# Patient Record
Sex: MALE | Race: BLACK OR AFRICAN AMERICAN | NOT HISPANIC OR LATINO | Employment: FULL TIME | URBAN - METROPOLITAN AREA
[De-identification: names, ages, dates, MRNs, and addresses within clinical notes are randomized per-mention and may not be internally consistent; named-entity substitution may affect disease eponyms.]

---

## 2024-10-24 ENCOUNTER — OFFICE VISIT (OUTPATIENT)
Dept: PRIMARY CARE CLINIC | Facility: CLINIC | Age: 49
End: 2024-10-24
Payer: COMMERCIAL

## 2024-10-24 VITALS
RESPIRATION RATE: 18 BRPM | WEIGHT: 173.63 LBS | DIASTOLIC BLOOD PRESSURE: 85 MMHG | OXYGEN SATURATION: 99 % | SYSTOLIC BLOOD PRESSURE: 125 MMHG | HEART RATE: 60 BPM | BODY MASS INDEX: 24.86 KG/M2 | HEIGHT: 70 IN

## 2024-10-24 DIAGNOSIS — Z12.5 SCREENING FOR PROSTATE CANCER: ICD-10-CM

## 2024-10-24 DIAGNOSIS — I10 PRIMARY HYPERTENSION: ICD-10-CM

## 2024-10-24 DIAGNOSIS — R22.1 NECK MASS: ICD-10-CM

## 2024-10-24 DIAGNOSIS — Z12.12 ENCOUNTER FOR COLORECTAL CANCER SCREENING: ICD-10-CM

## 2024-10-24 DIAGNOSIS — Z00.00 ENCOUNTER FOR WELLNESS EXAMINATION IN ADULT: Primary | ICD-10-CM

## 2024-10-24 DIAGNOSIS — Z12.11 ENCOUNTER FOR COLORECTAL CANCER SCREENING: ICD-10-CM

## 2024-10-24 LAB
ALBUMIN SERPL-MCNC: 3.9 G/DL (ref 3.5–5)
ALBUMIN/GLOB SERPL: 1.3 RATIO (ref 1.1–2)
ALP SERPL-CCNC: 68 UNIT/L (ref 40–150)
ALT SERPL-CCNC: 23 UNIT/L (ref 0–55)
ANION GAP SERPL CALC-SCNC: 8 MEQ/L
AST SERPL-CCNC: 18 UNIT/L (ref 5–34)
BASOPHILS # BLD AUTO: 0.03 X10(3)/MCL
BASOPHILS NFR BLD AUTO: 0.4 %
BILIRUB SERPL-MCNC: 0.7 MG/DL
BUN SERPL-MCNC: 16.6 MG/DL (ref 8.9–20.6)
CALCIUM SERPL-MCNC: 9 MG/DL (ref 8.4–10.2)
CHLORIDE SERPL-SCNC: 109 MMOL/L (ref 98–107)
CHOLEST SERPL-MCNC: 171 MG/DL
CHOLEST/HDLC SERPL: 3 {RATIO} (ref 0–5)
CO2 SERPL-SCNC: 24 MMOL/L (ref 22–29)
CREAT SERPL-MCNC: 1.2 MG/DL (ref 0.72–1.25)
CREAT/UREA NIT SERPL: 14
EOSINOPHIL # BLD AUTO: 0.13 X10(3)/MCL (ref 0–0.9)
EOSINOPHIL NFR BLD AUTO: 1.7 %
ERYTHROCYTE [DISTWIDTH] IN BLOOD BY AUTOMATED COUNT: 13 % (ref 11.5–17)
EST. AVERAGE GLUCOSE BLD GHB EST-MCNC: 99.7 MG/DL
GFR SERPLBLD CREATININE-BSD FMLA CKD-EPI: >60 ML/MIN/1.73/M2
GLOBULIN SER-MCNC: 3.1 GM/DL (ref 2.4–3.5)
GLUCOSE SERPL-MCNC: 90 MG/DL (ref 74–100)
HBA1C MFR BLD: 5.1 %
HCT VFR BLD AUTO: 44.2 % (ref 42–52)
HDLC SERPL-MCNC: 58 MG/DL (ref 35–60)
HGB BLD-MCNC: 15.3 G/DL (ref 14–18)
IMM GRANULOCYTES # BLD AUTO: 0.02 X10(3)/MCL (ref 0–0.04)
IMM GRANULOCYTES NFR BLD AUTO: 0.3 %
LDLC SERPL CALC-MCNC: 100 MG/DL (ref 50–140)
LYMPHOCYTES # BLD AUTO: 1.96 X10(3)/MCL (ref 0.6–4.6)
LYMPHOCYTES NFR BLD AUTO: 26.1 %
MCH RBC QN AUTO: 29.3 PG (ref 27–31)
MCHC RBC AUTO-ENTMCNC: 34.6 G/DL (ref 33–36)
MCV RBC AUTO: 84.5 FL (ref 80–94)
MONOCYTES # BLD AUTO: 0.61 X10(3)/MCL (ref 0.1–1.3)
MONOCYTES NFR BLD AUTO: 8.1 %
NEUTROPHILS # BLD AUTO: 4.77 X10(3)/MCL (ref 2.1–9.2)
NEUTROPHILS NFR BLD AUTO: 63.4 %
NRBC BLD AUTO-RTO: 0 %
PLATELET # BLD AUTO: 225 X10(3)/MCL (ref 130–400)
PMV BLD AUTO: 11.5 FL (ref 7.4–10.4)
POTASSIUM SERPL-SCNC: 4.1 MMOL/L (ref 3.5–5.1)
PROT SERPL-MCNC: 7 GM/DL (ref 6.4–8.3)
PSA SERPL-MCNC: 0.5 NG/ML
RBC # BLD AUTO: 5.23 X10(6)/MCL (ref 4.7–6.1)
SODIUM SERPL-SCNC: 141 MMOL/L (ref 136–145)
TRIGL SERPL-MCNC: 65 MG/DL (ref 34–140)
TSH SERPL-ACNC: 1.26 UIU/ML (ref 0.35–4.94)
VLDLC SERPL CALC-MCNC: 13 MG/DL
WBC # BLD AUTO: 7.52 X10(3)/MCL (ref 4.5–11.5)

## 2024-10-24 PROCEDURE — 80061 LIPID PANEL: CPT | Performed by: STUDENT IN AN ORGANIZED HEALTH CARE EDUCATION/TRAINING PROGRAM

## 2024-10-24 PROCEDURE — 84153 ASSAY OF PSA TOTAL: CPT | Performed by: STUDENT IN AN ORGANIZED HEALTH CARE EDUCATION/TRAINING PROGRAM

## 2024-10-24 PROCEDURE — 80053 COMPREHEN METABOLIC PANEL: CPT | Performed by: STUDENT IN AN ORGANIZED HEALTH CARE EDUCATION/TRAINING PROGRAM

## 2024-10-24 PROCEDURE — 84443 ASSAY THYROID STIM HORMONE: CPT | Performed by: STUDENT IN AN ORGANIZED HEALTH CARE EDUCATION/TRAINING PROGRAM

## 2024-10-24 PROCEDURE — 36415 COLL VENOUS BLD VENIPUNCTURE: CPT | Performed by: STUDENT IN AN ORGANIZED HEALTH CARE EDUCATION/TRAINING PROGRAM

## 2024-10-24 PROCEDURE — 85025 COMPLETE CBC W/AUTO DIFF WBC: CPT | Performed by: STUDENT IN AN ORGANIZED HEALTH CARE EDUCATION/TRAINING PROGRAM

## 2024-10-24 PROCEDURE — 83036 HEMOGLOBIN GLYCOSYLATED A1C: CPT | Performed by: STUDENT IN AN ORGANIZED HEALTH CARE EDUCATION/TRAINING PROGRAM

## 2024-10-24 RX ORDER — HYDROCHLOROTHIAZIDE 25 MG/1
25 TABLET ORAL DAILY
COMMUNITY
Start: 2024-08-21 | End: 2024-10-24 | Stop reason: SDUPTHER

## 2024-10-24 RX ORDER — AMLODIPINE BESYLATE 10 MG/1
10 TABLET ORAL DAILY
COMMUNITY
Start: 2024-08-21 | End: 2024-10-24 | Stop reason: SDUPTHER

## 2024-10-24 RX ORDER — AMLODIPINE BESYLATE 10 MG/1
10 TABLET ORAL DAILY
Qty: 90 TABLET | Refills: 1 | Status: SHIPPED | OUTPATIENT
Start: 2024-10-24 | End: 2025-04-22

## 2024-10-24 RX ORDER — HYDROCHLOROTHIAZIDE 25 MG/1
25 TABLET ORAL DAILY
Qty: 90 TABLET | Refills: 1 | Status: SHIPPED | OUTPATIENT
Start: 2024-10-24 | End: 2025-04-22

## 2024-10-24 NOTE — PROGRESS NOTES
Chief Complaint  Chief Complaint   Patient presents with    [facility]       HPI  Aidan Aceves is a 49 y.o. male with medical diagnoses as listed in the medical history and problem list that presents for new patient wellness examination.     Current and past medical history reviewed.  Pertinent family and social history reviewed.  - Just moved from [place] for work   -    Colorectal cancer screening:  Cologuard pending  Prostate CA screening:  Prostate CA screening pending   Vaccinations due:  All vaccinations due and/or desired have been addressed and given    Patient also complains of a small bump on the back of his neck that has been there for years but recently got bigger. No pain or drainage from it. Patient had an ultrasound 2021 suggesting biopsy but he did not follow through with it.       Health Maintenance         Date Due Completion Date    Hepatitis C Screening Never done ---    HIV Screening Never done ---    TETANUS VACCINE Never done ---    Colorectal Cancer Screening Never done ---    Influenza Vaccine (1) Never done ---    COVID-19 Vaccine (1 - 2024-25 season) Never done ---    Lipid Panel 09/28/2028 9/28/2023    RSV Vaccine (Age 60+ and Pregnant patients) (1 - 1-dose 75+ series) 04/03/2051 ---            ALLERGIES AND MEDICATIONS: updated and reviewed.  Review of patient's allergies indicates:  No Known Allergies  Current Outpatient Medications   Medication Sig Dispense Refill    amLODIPine (NORVASC) 10 MG tablet Take 1 tablet (10 mg total) by mouth once daily. 90 tablet 1    hydroCHLOROthiazide (HYDRODIURIL) 25 MG tablet Take 1 tablet (25 mg total) by mouth once daily. 90 tablet 1     No current facility-administered medications for this visit.       Histories are reviewed and updated as appropriate     Review of Systems  Comprehensive review of system performed- negative except noted in HPI       Objective:   Vitals:    10/24/24 1309   BP: 125/85   BP Location: Left arm   Patient  "Position: Sitting   Pulse: 60   Resp: 18   SpO2: 99%   Weight: 78.7 kg (173 lb 9.6 oz)   Height: 5' 10" (1.778 m)    Body mass index is 24.91 kg/m².  Physical Exam  Vitals and nursing note reviewed.   Constitutional:       General: He is not in acute distress.     Appearance: Normal appearance.   HENT:      Head: Normocephalic and atraumatic.      Mouth/Throat:      Mouth: Mucous membranes are moist.   Eyes:      Extraocular Movements: Extraocular movements intact.      Conjunctiva/sclera: Conjunctivae normal.   Neck:      Comments: Raised lesion posterior neck measured 8dqm2fld~1.3cm- firm, mobile and non-temder  Cardiovascular:      Rate and Rhythm: Normal rate.   Pulmonary:      Effort: Pulmonary effort is normal.      Breath sounds: Normal breath sounds.   Abdominal:      General: Abdomen is flat. Bowel sounds are normal.      Palpations: Abdomen is soft.   Musculoskeletal:         General: Normal range of motion.      Cervical back: Normal range of motion.   Skin:     General: Skin is warm and dry.   Neurological:      General: No focal deficit present.      Mental Status: He is alert and oriented to person, place, and time. Mental status is at baseline.   Psychiatric:         Mood and Affect: Mood normal.         Behavior: Behavior normal.           Assessment & Plan  1. Encounter for wellness examination in adult  Overall health status was reviewed   Good health habits reinforced   Labs pending Appropriate recommendations and preventative care medical information provided, with annual wellness exam encouraged.     2. Primary hypertension  -    Stable on current regimen    -    hydroCHLOROthiazide (HYDRODIURIL) 25 MG tablet; Take 1 tablet (25 mg total) by mouth once daily.  Dispense: 90 tablet; Refill: 1  -     amLODIPine (NORVASC) 10 MG tablet; Take 1 tablet (10 mg total) by mouth once daily.  Dispense: 90 tablet; Refill: 1    3. Neck mass  -     US Soft Tissue Head Neck; Future; Expected date: 10/24/2024  -  "    Ambulatory referral/consult to General Surgery; Future; Expected date: 10/31/2024    4. Encounter for colorectal cancer screening  -     Cologuard Screening (Multitarget Stool DNA); Future; Expected date: 10/24/2024       RTC in 6 months for chronic conditions follow up

## 2024-10-25 ENCOUNTER — TELEPHONE (OUTPATIENT)
Dept: SURGERY | Facility: CLINIC | Age: 49
End: 2024-10-25
Payer: COMMERCIAL

## 2024-10-30 ENCOUNTER — PATIENT MESSAGE (OUTPATIENT)
Dept: PRIMARY CARE CLINIC | Facility: CLINIC | Age: 49
End: 2024-10-30
Payer: COMMERCIAL

## 2024-11-04 ENCOUNTER — TELEPHONE (OUTPATIENT)
Dept: PRIMARY CARE CLINIC | Facility: CLINIC | Age: 49
End: 2024-11-04
Payer: COMMERCIAL

## 2024-11-04 NOTE — TELEPHONE ENCOUNTER
----- Message from Cassius Lambert MD sent at 10/30/2024  4:12 PM CDT -----  All the bloodwork are within normal limit. Including blood count, electrolyte, thyroid, diabetes, cholesterol and prostate,

## 2024-11-12 ENCOUNTER — TELEPHONE (OUTPATIENT)
Dept: PRIMARY CARE CLINIC | Facility: CLINIC | Age: 49
End: 2024-11-12
Payer: COMMERCIAL

## 2024-11-13 NOTE — PROGRESS NOTES
[facility] - General Surgery Services  [address] [place] [id number]  Phone: [phone]  Fax: [phone]    History and Physical   General Surgery  Dr. Jorge Horn   Patient Name: Aidan Aceves  YOB: 1975    Date: 11/14/2024                     SUBJECTIVE:     Chief Complaint/Reason for Admission:   Chief Complaint   Patient presents with    Consult     Neck mass         History of Present Illness:  Mr. Aidan Aceves is a 49 y.o. male referred for surgical evaluation of mass. He reports a mass on posterior neck, present for several years, it has continued to increase in size and is now causing some discomfort. Denies trauma. Denies CP / SOB. Denies fever / chills.   No imaging.    Referring provider: Cassius Lambert MD     Patient Care Team:  Cassius Lambert MD as PCP - General (Family Medicine)     Review of Systems:  12 point ROS negative except as stated in HPI    PAST HISTORY:     Past Medical History:   Diagnosis Date    Hypertension      Past Surgical History:   Procedure Laterality Date    BACK SURGERY      microdesectomy     Family History   Problem Relation Name Age of Onset    Hypertension Mother      No Known Problems Father       Social History     Socioeconomic History    Marital status:    Tobacco Use    Smoking status: Some Days     Types: Cigarettes    Smokeless tobacco: Never   Substance and Sexual Activity    Alcohol use: Yes     Alcohol/week: 4.0 standard drinks of alcohol     Types: 4 Drinks containing 0.5 oz of alcohol per week    Drug use: Never       MEDICATIONS & ALLERGIES:     Current Outpatient Medications on File Prior to Visit   Medication Sig    amLODIPine (NORVASC) 10 MG tablet Take 1 tablet (10 mg total) by mouth once daily.    hydroCHLOROthiazide (HYDRODIURIL) 25 MG tablet Take 1 tablet (25 mg total) by mouth once daily.     No current facility-administered medications on file prior to visit.     Review of  "patient's allergies indicates:  No Known Allergies    OBJECTIVE:     Vitals:    11/14/24 0915   BP: 125/85   Pulse: 76   Weight: 79.4 kg (175 lb)   Height: 5' 10" (1.778 m)     Body mass index is 25.11 kg/m².    Physical Exam:  General:  Well developed, well nourished, no acute distress  HEENT:  Normocephalic, atraumatic, PERRL, EOMI, clear sclera, ears normal, neck supple, throat clear without erythema or exudates  CVS:  RRR, S1 and S2 normal, no murmurs, rubs, gallops  Resp:  Lungs clear to auscultation, no wheezes, rales, rhonchi, cough  GI:  Abdomen soft, non-tender, non-distended, normoactive bowel sounds, no masses  :  Deferred  MSK:  No muscle atrophy, cyanosis, peripheral edema, full range of motion  Skin:   midline posterior neck  Size: 3 CM  Neuro:  CNII-XII grossly intact  Psych:  Alert and oriented to person, place, and time    Results:  I have independently reviewed all pertinent lab and radiologic studies relevant to general surgery.    VISIT DIAGNOSES:       ICD-10-CM ICD-9-CM   1. Neck mass  R22.1 784.2       ASSESSMENT/PLAN:   Mr. Aidan Aceves is a 49 y.o. male with soft tissue mass.     Plan:  - Excision of soft tissue mass   posterior neck  Size: 3 CM  - MAC anesthesia  - Dr. Horn examined patient, discussed recommendations, discussed risks/benefits of procedure, obtained informed consent, and answered all questions.   - Counseling included differential diagnoses, treatment options, risks and benefits, lifestyle changes, prognosis, and medications.    The patient was interactive, and verbalized understanding.    JORJE Schwartz    I, JORJE Snider, am scribing for, and in the presence of, Jorge Horn MD, performed the above scribed service and the documentation accurately describes the services I performed. I attest to the accuracy of the note.            "

## 2024-11-14 ENCOUNTER — OFFICE VISIT (OUTPATIENT)
Dept: SURGERY | Facility: CLINIC | Age: 49
End: 2024-11-14
Payer: COMMERCIAL

## 2024-11-14 VITALS
HEART RATE: 76 BPM | DIASTOLIC BLOOD PRESSURE: 85 MMHG | WEIGHT: 175 LBS | HEIGHT: 70 IN | SYSTOLIC BLOOD PRESSURE: 125 MMHG | BODY MASS INDEX: 25.05 KG/M2

## 2024-11-14 DIAGNOSIS — R22.1 NECK MASS: ICD-10-CM

## 2024-11-14 PROCEDURE — 99204 OFFICE O/P NEW MOD 45 MIN: CPT | Mod: ,,, | Performed by: SURGERY

## 2024-11-14 PROCEDURE — 3079F DIAST BP 80-89 MM HG: CPT | Mod: CPTII,,, | Performed by: SURGERY

## 2024-11-14 PROCEDURE — 3074F SYST BP LT 130 MM HG: CPT | Mod: CPTII,,, | Performed by: SURGERY

## 2024-11-14 PROCEDURE — 3008F BODY MASS INDEX DOCD: CPT | Mod: CPTII,,, | Performed by: SURGERY

## 2024-11-14 PROCEDURE — 3044F HG A1C LEVEL LT 7.0%: CPT | Mod: CPTII,,, | Performed by: SURGERY

## 2024-11-14 PROCEDURE — 1159F MED LIST DOCD IN RCRD: CPT | Mod: CPTII,,, | Performed by: SURGERY

## 2024-11-14 NOTE — TELEPHONE ENCOUNTER
Called pt to offer appt. Next appt not until 2-3 weeks out. Pt stated he will try to go [place] [facility] [place] to [place]

## 2024-11-17 ENCOUNTER — OFFICE VISIT (OUTPATIENT)
Dept: URGENT CARE | Facility: CLINIC | Age: 49
End: 2024-11-17
Payer: COMMERCIAL

## 2024-11-17 VITALS
BODY MASS INDEX: 25.05 KG/M2 | HEART RATE: 63 BPM | WEIGHT: 175 LBS | DIASTOLIC BLOOD PRESSURE: 86 MMHG | OXYGEN SATURATION: 98 % | TEMPERATURE: 99 F | HEIGHT: 70 IN | SYSTOLIC BLOOD PRESSURE: 129 MMHG | RESPIRATION RATE: 17 BRPM

## 2024-11-17 DIAGNOSIS — R21 RASH AND NONSPECIFIC SKIN ERUPTION: Primary | ICD-10-CM

## 2024-11-17 PROCEDURE — 96372 THER/PROPH/DIAG INJ SC/IM: CPT | Mod: ,,, | Performed by: FAMILY MEDICINE

## 2024-11-17 PROCEDURE — 99213 OFFICE O/P EST LOW 20 MIN: CPT | Mod: 25,,, | Performed by: FAMILY MEDICINE

## 2024-11-17 RX ORDER — PREDNISONE 20 MG/1
40 TABLET ORAL DAILY
Qty: 10 TABLET | Refills: 0 | Status: SHIPPED | OUTPATIENT
Start: 2024-11-17 | End: 2024-11-22

## 2024-11-17 RX ORDER — DEXAMETHASONE SODIUM PHOSPHATE 10 MG/ML
10 INJECTION INTRAMUSCULAR; INTRAVENOUS
Status: COMPLETED | OUTPATIENT
Start: 2024-11-17 | End: 2024-11-17

## 2024-11-17 RX ADMIN — DEXAMETHASONE SODIUM PHOSPHATE 10 MG: 10 INJECTION INTRAMUSCULAR; INTRAVENOUS at 11:11

## 2024-11-17 NOTE — PATIENT INSTRUCTIONS
Plan:   Medications sent to pharmacy  Start oral steroids tomorrow  Claritin or Zyrtec in the morning and Benadryl at bedtime to help with itch  Keep a diary of what your coming into contact with, what you are putting on the body in the body to see identify a trigger.  As discussed if there is no improvement in a week we will refer you to Dermatology.  Call us for referral

## 2024-11-17 NOTE — PROGRESS NOTES
"Subjective:      Patient ID: Aidan Aceves is a 49 y.o. male.    Vitals:  height is 5' 10" (1.778 m) and weight is 79.4 kg (175 lb). His temperature is 98.5 °F (36.9 °C). His blood pressure is 129/86 and his pulse is 63. His respiration is 17 and oxygen saturation is 98%.     Chief Complaint: Rash     Patient is a 49 y.o. male who presents to urgent care with complaints of  itching and burning all over body for the last 2 weeks and is spreading.  Patient denies fever.  Patient states it began with a large oval patch of rash on the right upper thigh area then it spread to the arms and trunk.  Denies any changes in foods soaps detergents medications.  Denies any recent travel.  Denies any new pets in the home.  States nothing has changed.  He has been using shaped butter but has use that in the past without issues.  Denies any shortness a breath wheezing lip swelling or tongue swelling    Rash      Constitution: Negative.   HENT: Negative.     Neck: neck negative.   Cardiovascular: Negative.    Eyes: Negative.    Respiratory: Negative.     Gastrointestinal: Negative.    Genitourinary: Negative.    Musculoskeletal: Negative.    Skin:  Positive for rash.   Allergic/Immunologic: Negative.    Neurological: Negative.    Hematologic/Lymphatic: Negative.       Objective:     Physical Exam   Constitutional: He is oriented to person, place, and time.  Non-toxic appearance. He does not appear ill. No distress.   HENT:   Head: Normocephalic and atraumatic.   Mouth/Throat: No oropharyngeal exudate or posterior oropharyngeal erythema (no lip or tongue swelling).   Eyes: Conjunctivae are normal.   Pulmonary/Chest: Effort normal. No respiratory distress.   Abdominal: Normal appearance.   Neurological: He is oriented to person, place, and time.   Skin: Skin is not diaphoretic and rash (erythemic papular rash on the bilateral arms and trunk).   Psychiatric: His behavior is normal. Mood, judgment and thought content normal.   Vitals " "reviewed.         Previous History      Review of patient's allergies indicates:  No Known Allergies    Past Medical History:   Diagnosis Date    Hypertension      Current Outpatient Medications   Medication Instructions    amLODIPine (NORVASC) 10 mg, Oral, Daily    hydroCHLOROthiazide (HYDRODIURIL) 25 mg, Oral, Daily    predniSONE (DELTASONE) 40 mg, Oral, Daily     Past Surgical History:   Procedure Laterality Date    BACK SURGERY      microdesectomy    SPINE SURGERY       Family History   Problem Relation Name Age of Onset    Hypertension Mother Mela     Cancer Father Migel        Social History     Tobacco Use    Smoking status: Some Days     Types: Cigarettes, Cigars    Smokeless tobacco: Never   Substance Use Topics    Alcohol use: Yes     Alcohol/week: 4.0 standard drinks of alcohol     Types: 4 Drinks containing 0.5 oz of alcohol per week    Drug use: Never        Physical Exam      Vital Signs Reviewed   /86   Pulse 63   Temp 98.5 °F (36.9 °C)   Resp 17   Ht 5' 10" (1.778 m)   Wt 79.4 kg (175 lb)   SpO2 98%   BMI 25.11 kg/m²        Procedures    Procedures     Labs     Results for orders placed or performed in visit on 10/24/24   Comprehensive Metabolic Panel    Collection Time: 10/24/24  2:02 PM   Result Value Ref Range    Sodium 141 136 - 145 mmol/L    Potassium 4.1 3.5 - 5.1 mmol/L    Chloride 109 (H) 98 - 107 mmol/L    CO2 24 22 - 29 mmol/L    Glucose 90 74 - 100 mg/dL    Blood Urea Nitrogen 16.6 8.9 - 20.6 mg/dL    Creatinine 1.20 0.72 - 1.25 mg/dL    Calcium 9.0 8.4 - 10.2 mg/dL    Protein Total 7.0 6.4 - 8.3 gm/dL    Albumin 3.9 3.5 - 5.0 g/dL    Globulin 3.1 2.4 - 3.5 gm/dL    Albumin/Globulin Ratio 1.3 1.1 - 2.0 ratio    Bilirubin Total 0.7 <=1.5 mg/dL    ALP 68 40 - 150 unit/L    ALT 23 0 - 55 unit/L    AST 18 5 - 34 unit/L    eGFR >60 mL/min/1.73/m2    Anion Gap 8.0 mEq/L    BUN/Creatinine Ratio 14    Hemoglobin A1C    Collection Time: 10/24/24  2:02 PM   Result Value Ref Range    " Hemoglobin A1c 5.1 <=7.0 %    Estimated Average Glucose 99.7 mg/dL   Lipid Panel    Collection Time: 10/24/24  2:02 PM   Result Value Ref Range    Cholesterol Total 171 <=200 mg/dL    HDL Cholesterol 58 35 - 60 mg/dL    Triglyceride 65 34 - 140 mg/dL    Cholesterol/HDL Ratio 3 0 - 5    Very Low Density Lipoprotein 13     LDL Cholesterol 100.00 50.00 - 140.00 mg/dL   PSA, Screening    Collection Time: 10/24/24  2:02 PM   Result Value Ref Range    Prostate Specific Antigen 0.50 <=4.00 ng/mL   TSH    Collection Time: 10/24/24  2:02 PM   Result Value Ref Range    TSH 1.261 0.350 - 4.940 uIU/mL   CBC with Differential    Collection Time: 10/24/24  2:02 PM   Result Value Ref Range    WBC 7.52 4.50 - 11.50 x10(3)/mcL    RBC 5.23 4.70 - 6.10 x10(6)/mcL    Hgb 15.3 14.0 - 18.0 g/dL    Hct 44.2 42.0 - 52.0 %    MCV 84.5 80.0 - 94.0 fL    MCH 29.3 27.0 - 31.0 pg    MCHC 34.6 33.0 - 36.0 g/dL    RDW 13.0 11.5 - 17.0 %    Platelet 225 130 - 400 x10(3)/mcL    MPV 11.5 (H) 7.4 - 10.4 fL    Neut % 63.4 %    Lymph % 26.1 %    Mono % 8.1 %    Eos % 1.7 %    Basophil % 0.4 %    Lymph # 1.96 0.6 - 4.6 x10(3)/mcL    Neut # 4.77 2.1 - 9.2 x10(3)/mcL    Mono # 0.61 0.1 - 1.3 x10(3)/mcL    Eos # 0.13 0 - 0.9 x10(3)/mcL    Baso # 0.03 <=0.2 x10(3)/mcL    IG# 0.02 0 - 0.04 x10(3)/mcL    IG% 0.3 %    NRBC% 0.0 %       Assessment:     1. Rash and nonspecific skin eruption        Plan:   Medications sent to pharmacy  Start oral steroids tomorrow  Claritin or Zyrtec in the morning and Benadryl at bedtime to help with itch  Keep a diary of what your coming into contact with, what you are putting on the body in the body to see identify a trigger.  As discussed if there is no improvement in a week we will refer you to Dermatology.  Call us for referral    Pityriasis rosacea is a consideration given he had an large oval patch at the start of the illness.  Rash and nonspecific skin eruption    Other orders  -     dexAMETHasone injection 10 mg  -      predniSONE (DELTASONE) 20 MG tablet; Take 2 tablets (40 mg total) by mouth once daily. for 5 days  Dispense: 10 tablet; Refill: 0

## 2024-11-18 DIAGNOSIS — R22.1 NECK MASS: Primary | ICD-10-CM

## 2024-11-20 ENCOUNTER — TELEPHONE (OUTPATIENT)
Dept: SURGERY | Facility: CLINIC | Age: 49
End: 2024-11-20
Payer: COMMERCIAL

## 2024-11-20 NOTE — TELEPHONE ENCOUNTER
Called pt regarding rescheduling his surgery for 1/3/25. He states that he realized that his deductible would be starting over and asked if we could hold off on the surgery for now so he can save up some money. I enc him to call if needs and will put him on a reminder to check back with him.

## 2025-01-09 ENCOUNTER — PATIENT OUTREACH (OUTPATIENT)
Facility: CLINIC | Age: 50
End: 2025-01-09
Payer: COMMERCIAL

## 2025-03-10 ENCOUNTER — TELEPHONE (OUTPATIENT)
Dept: SURGERY | Facility: CLINIC | Age: 50
End: 2025-03-10
Payer: COMMERCIAL

## 2025-03-10 NOTE — TELEPHONE ENCOUNTER
Called pt to discuss .. No answer . Left detailed msg on vm. Instructed pt to call if wanting surgery. Reminder set to fu in a few weeks.

## 2025-03-10 NOTE — TELEPHONE ENCOUNTER
----- Message from XCOHITL Shell sent at 11/20/2024  2:03 PM CST -----  Regarding: Surgery?  Pt was scheduled for Excision STM Post Neck on 1/3/25. Called pt to move surgery and he states that he wants to wait. I told him we would check on him sometimes in March to see if he ready for surger

## 2025-04-01 ENCOUNTER — PATIENT OUTREACH (OUTPATIENT)
Facility: CLINIC | Age: 50
End: 2025-04-01
Payer: COMMERCIAL

## 2025-04-01 NOTE — PROGRESS NOTES
Health Maintenance Topic(s) Outreach Outcomes & Actions Taken:  3 month follow up, no answer voicemail is full.  Portal message not read, Letter mailed today.    Primary Care Appt - Outreach Outcomes & Actions Taken  : 6 month follow up scheduled for 4/24/25 , Last AW 10/24/2025    Colorectal Cancer Screening - Outreach Outcomes & Actions Taken  : Cologuard pending.    Lab(s) - Outreach Outcomes & Actions Taken  : Hep C and HIV never done .     Additional Notes:  Immunizations reviewed.  SDOH   Tobacco Use   Nov 17, 2024: High Risk     Food Insecurity   Nov 14, 2024: Food Insecurity Present

## 2025-04-01 NOTE — LETTER
"          Dear Davion [facility] is committed to your overall health. Periodically we review the health information in your chart to make sure you are up to date on all of your recommended tests and/or procedures.       Our review of your chart shows that you may be due for the following Health Topic.       Colon Cancer Screening           If you have had a Colon screening done at another facility, please let us know and we will request a copy of the report to update your [facility] record.      If you have any questions, please contact Julieta Alejo (Connie) ,Care Coordinator @ [phone]    Cassius Lambert MD and your [facility] Primary Care Team    "

## 2025-04-23 ENCOUNTER — TELEPHONE (OUTPATIENT)
Dept: SURGERY | Facility: CLINIC | Age: 50
End: 2025-04-23
Payer: COMMERCIAL

## 2025-04-23 NOTE — TELEPHONE ENCOUNTER
----- Message from Med Assistant Santiago sent at 3/10/2025 11:56 AM CDT -----  Regarding: RE: Surgery?  3/10/25Called pt to discuss .. No answer . Left detailed msg on vm. Instructed pt to call if wanting surgery. Reminder set to fu in a few weeks.  ----- Message -----  From: Sumaya Graves RN  Sent: 3/10/2025  12:00 AM CDT  To: Justina Patel Staff  Subject: Surgery?                                         Pt was scheduled for Excision STM Post Neck on 1/3/25. Called pt to move surgery and he states that he wants to wait. I told him we would check on him sometimes in March to see if he ready for surger

## 2025-04-23 NOTE — TELEPHONE ENCOUNTER
Called pt again to check on status of r/sing sx. No answer left detailed message. And sent [facility] letter.

## 2025-04-24 ENCOUNTER — OFFICE VISIT (OUTPATIENT)
Dept: PRIMARY CARE CLINIC | Facility: CLINIC | Age: 50
End: 2025-04-24
Payer: COMMERCIAL

## 2025-04-24 VITALS
HEART RATE: 68 BPM | TEMPERATURE: 98 F | SYSTOLIC BLOOD PRESSURE: 114 MMHG | BODY MASS INDEX: 25.05 KG/M2 | DIASTOLIC BLOOD PRESSURE: 80 MMHG | WEIGHT: 175 LBS | OXYGEN SATURATION: 99 % | RESPIRATION RATE: 18 BRPM | HEIGHT: 70 IN

## 2025-04-24 DIAGNOSIS — I10 PRIMARY HYPERTENSION: ICD-10-CM

## 2025-04-24 DIAGNOSIS — R53.82 CHRONIC FATIGUE: ICD-10-CM

## 2025-04-24 DIAGNOSIS — Z00.00 ENCOUNTER FOR WELLNESS EXAMINATION IN ADULT: ICD-10-CM

## 2025-04-24 DIAGNOSIS — Z12.5 SCREENING FOR PROSTATE CANCER: ICD-10-CM

## 2025-04-24 DIAGNOSIS — Z13.6 SCREENING FOR CARDIOVASCULAR CONDITION: ICD-10-CM

## 2025-04-24 DIAGNOSIS — Z12.11 SCREENING FOR COLON CANCER: Primary | ICD-10-CM

## 2025-04-24 PROCEDURE — 3008F BODY MASS INDEX DOCD: CPT | Mod: CPTII,,, | Performed by: STUDENT IN AN ORGANIZED HEALTH CARE EDUCATION/TRAINING PROGRAM

## 2025-04-24 PROCEDURE — 1160F RVW MEDS BY RX/DR IN RCRD: CPT | Mod: CPTII,,, | Performed by: STUDENT IN AN ORGANIZED HEALTH CARE EDUCATION/TRAINING PROGRAM

## 2025-04-24 PROCEDURE — 3079F DIAST BP 80-89 MM HG: CPT | Mod: CPTII,,, | Performed by: STUDENT IN AN ORGANIZED HEALTH CARE EDUCATION/TRAINING PROGRAM

## 2025-04-24 PROCEDURE — 1159F MED LIST DOCD IN RCRD: CPT | Mod: CPTII,,, | Performed by: STUDENT IN AN ORGANIZED HEALTH CARE EDUCATION/TRAINING PROGRAM

## 2025-04-24 PROCEDURE — 3074F SYST BP LT 130 MM HG: CPT | Mod: CPTII,,, | Performed by: STUDENT IN AN ORGANIZED HEALTH CARE EDUCATION/TRAINING PROGRAM

## 2025-04-24 PROCEDURE — 99214 OFFICE O/P EST MOD 30 MIN: CPT | Mod: ,,, | Performed by: STUDENT IN AN ORGANIZED HEALTH CARE EDUCATION/TRAINING PROGRAM

## 2025-04-24 RX ORDER — HYDROCHLOROTHIAZIDE 25 MG/1
25 TABLET ORAL DAILY
Qty: 90 TABLET | Refills: 3 | Status: SHIPPED | OUTPATIENT
Start: 2025-04-24

## 2025-04-24 RX ORDER — AMLODIPINE BESYLATE 10 MG/1
10 TABLET ORAL DAILY
Qty: 90 TABLET | Refills: 3 | Status: SHIPPED | OUTPATIENT
Start: 2025-04-24

## 2025-04-24 NOTE — PROGRESS NOTES
"Chief Complaint  Chief Complaint   Patient presents with    Hypertension     6 month follow up    Rash     Triamcinolone acetonide 0.1% given by dermatologist, but not effective        HPI  Aidan Aceves is a 50 y.o. male with medical diagnoses as listed in the medical history and problem list that presents for 6 months follow up for chronic conditions. Patient is compliant with hypertensive medications without any side effects. Weight is stable  Patient complains occasional itching episodes of whole trunk that last for minutes and then go away. Denies rash when it is not flare up.  He used to see derm and given triamcinolone without resolution.   Patient is due to colorectal cancer screening and desires to have C-scope instead of Cologuard sent home since last visit.     Health Maintenance         Date Due Completion Date    Hepatitis C Screening Never done ---    Colorectal Cancer Screening Never done ---    TETANUS VACCINE 04/24/2026 (Originally 4/3/1994) ---    Pneumococcal Vaccines (Age 0-49) (1 of 2 - PCV) 04/24/2026 (Originally 4/3/1995) ---    HIV Screening 04/26/2026 (Originally 4/3/1991) ---    Hemoglobin A1c (Diabetic Prevention Screening) 10/24/2027 10/24/2024    Lipid Panel 10/24/2029 10/24/2024            ALLERGIES AND MEDICATIONS: updated and reviewed.  Review of patient's allergies indicates:  No Known Allergies  Current Medications[1]    Histories are reviewed and updated as appropriate     Review of Systems  Comprehensive review of system performed- negative except noted in HPI       Objective:   Vitals:    04/24/25 1007   BP: 114/80   BP Location: Left arm   Patient Position: Sitting   Pulse: 68   Resp: 18   Temp: 98.2 °F (36.8 °C)   TempSrc: Oral   SpO2: 99%   Weight: 79.4 kg (175 lb)   Height: 5' 10" (1.778 m)    Body mass index is 25.11 kg/m².  Physical Exam  Vitals and nursing note reviewed.   Constitutional:       General: He is not in acute distress.     Appearance: Normal appearance. "   HENT:      Head: Normocephalic and atraumatic.      Mouth/Throat:      Mouth: Mucous membranes are moist.   Eyes:      Extraocular Movements: Extraocular movements intact.      Conjunctiva/sclera: Conjunctivae normal.   Cardiovascular:      Rate and Rhythm: Normal rate.   Pulmonary:      Effort: Pulmonary effort is normal.   Abdominal:      General: Abdomen is flat. Bowel sounds are normal.   Musculoskeletal:         General: Normal range of motion.      Cervical back: Normal range of motion.   Skin:     General: Skin is warm and dry.   Neurological:      General: No focal deficit present.      Mental Status: He is alert and oriented to person, place, and time. Mental status is at baseline.   Psychiatric:         Mood and Affect: Mood normal.         Behavior: Behavior normal.           Assessment & Plan  1. Screening for colon cancer  -     Ambulatory referral/consult to Gastroenterology; Future; Expected date: 05/01/2025    2. Primary hypertension  -     amLODIPine (NORVASC) 10 MG tablet; Take 1 tablet (10 mg total) by mouth once daily.  Dispense: 90 tablet; Refill: 3  -     hydroCHLOROthiazide (HYDRODIURIL) 25 MG tablet; Take 1 tablet (25 mg total) by mouth once daily.  Dispense: 90 tablet; Refill: 3  Blood pressure goal <140/90, recommend DASH diet, record BP at home daily and bring log to next office visit to assure that home cuff is calibrated at minimum every 12 months, continue current medication regimen.      RTC in 6 months for wellness. Testosterone level needed.          [1]   Current Outpatient Medications   Medication Sig Dispense Refill    amLODIPine (NORVASC) 10 MG tablet Take 1 tablet (10 mg total) by mouth once daily. 90 tablet 3    hydroCHLOROthiazide (HYDRODIURIL) 25 MG tablet Take 1 tablet (25 mg total) by mouth once daily. 90 tablet 3     No current facility-administered medications for this visit.

## 2025-07-31 ENCOUNTER — PATIENT MESSAGE (OUTPATIENT)
Facility: CLINIC | Age: 50
End: 2025-07-31
Payer: COMMERCIAL